# Patient Record
Sex: MALE | Race: WHITE | NOT HISPANIC OR LATINO | URBAN - METROPOLITAN AREA
[De-identification: names, ages, dates, MRNs, and addresses within clinical notes are randomized per-mention and may not be internally consistent; named-entity substitution may affect disease eponyms.]

---

## 2019-02-05 ENCOUNTER — EMERGENCY (EMERGENCY)
Facility: HOSPITAL | Age: 59
LOS: 0 days | Discharge: HOME | End: 2019-02-05
Attending: EMERGENCY MEDICINE | Admitting: EMERGENCY MEDICINE
Payer: COMMERCIAL

## 2019-02-05 VITALS
OXYGEN SATURATION: 97 % | RESPIRATION RATE: 18 BRPM | HEART RATE: 65 BPM | TEMPERATURE: 98 F | SYSTOLIC BLOOD PRESSURE: 176 MMHG | DIASTOLIC BLOOD PRESSURE: 97 MMHG | WEIGHT: 179.9 LBS

## 2019-02-05 DIAGNOSIS — S06.0X9A CONCUSSION WITH LOSS OF CONSCIOUSNESS OF UNSPECIFIED DURATION, INITIAL ENCOUNTER: ICD-10-CM

## 2019-02-05 DIAGNOSIS — Z85.528 PERSONAL HISTORY OF OTHER MALIGNANT NEOPLASM OF KIDNEY: ICD-10-CM

## 2019-02-05 DIAGNOSIS — M54.6 PAIN IN THORACIC SPINE: ICD-10-CM

## 2019-02-05 DIAGNOSIS — S00.81XA ABRASION OF OTHER PART OF HEAD, INITIAL ENCOUNTER: ICD-10-CM

## 2019-02-05 DIAGNOSIS — M25.511 PAIN IN RIGHT SHOULDER: ICD-10-CM
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
## 2019-02-05 DIAGNOSIS — Z79.899 OTHER LONG TERM (CURRENT) DRUG THERAPY: ICD-10-CM
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
## 2019-02-05 DIAGNOSIS — S09.90XA UNSPECIFIED INJURY OF HEAD, INITIAL ENCOUNTER: ICD-10-CM

## 2019-02-05 PROCEDURE — 70450 CT HEAD/BRAIN W/O DYE: CPT | Mod: 26

## 2019-02-05 PROCEDURE — 99284 EMERGENCY DEPT VISIT MOD MDM: CPT

## 2019-02-05 PROCEDURE — 73030 X-RAY EXAM OF SHOULDER: CPT | Mod: 26,RT

## 2019-02-05 PROCEDURE — 72125 CT NECK SPINE W/O DYE: CPT | Mod: 26

## 2019-02-05 PROCEDURE — 71250 CT THORAX DX C-: CPT | Mod: 26

## 2019-02-05 RX ORDER — TETANUS TOXOID, REDUCED DIPHTHERIA TOXOID AND ACELLULAR PERTUSSIS VACCINE, ADSORBED 5; 2.5; 8; 8; 2.5 [IU]/.5ML; [IU]/.5ML; UG/.5ML; UG/.5ML; UG/.5ML
0.5 SUSPENSION INTRAMUSCULAR ONCE
Qty: 0 | Refills: 0 | Status: DISCONTINUED | OUTPATIENT
Start: 2019-02-05 | End: 2019-02-05

## 2019-02-05 RX ORDER — ACETAMINOPHEN 500 MG
975 TABLET ORAL ONCE
Qty: 0 | Refills: 0 | Status: COMPLETED | OUTPATIENT
Start: 2019-02-05 | End: 2019-02-05

## 2019-02-05 RX ADMIN — Medication 975 MILLIGRAM(S): at 16:48

## 2019-02-05 NOTE — ED PROVIDER NOTE - ATTENDING CONTRIBUTION TO CARE
Dr. Webb: I have personally performed a face to face bedside history and physical examination of this patient. I have discussed the history, examination, review of systems, assessment and plan of management with the resident. I have reviewed the electronic medical record and amended it to reflect my history, review of systems, physical exam, assessment and plan.
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
## 2019-02-05 NOTE — ED PROVIDER NOTE - CARDIAC, MLM
Normal rate, regular rhythm.  Heart sounds S1, S2.  No murmurs, rubs or gallops. Normal rate, regular rhythm. normal radial pulse.
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
## 2019-02-05 NOTE — ED ADULT NURSE NOTE - NSIMPLEMENTINTERV_GEN_ALL_ED
Implemented All Universal Safety Interventions:  [place] to call system. Call bell, personal items and telephone within reach. Instruct patient to call for assistance. Room bathroom lighting operational. Non-slip footwear when patient is off stretcher. Physically safe environment: no spills, clutter or unnecessary equipment. Stretcher in lowest position, wheels locked, appropriate side rails in place.

## 2019-02-05 NOTE — ED PROVIDER NOTE - PROGRESS NOTE DETAILS
Ronal Gamboa for ED attending, Dr. Webb.  Pt seen and examined at bedside.  PE entered in chart. I Daniel Gamboa attest that this documentation has been prepared under the direction and in the presence of Doctor Wallo Anthony Bond (Resident): The patient’s cervical collar was clinically cleared using the NEXUS criteria: they have no focal neurologic deficit, no midline cervical spine tenderness is present, they have a normal level of consciousness and are not intoxicated, and no distracting injury is present. patient walked, feels better - d/w patient incidental findings of nodules and renal findings, but patient has known renal disease - will be safe to d/c home - d/w patient expected course of concussion
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
## 2019-02-05 NOTE — ED PROVIDER NOTE - ENMT, MLM
Airway patent, Nasal mucosa clear. Mouth with normal mucosa. Throat has no vesicles, no oropharyngeal exudates and uvula is midline. Airway patent, Nasal mucosa clear. Mouth with mildly dry mucosa. Throat has no vesicles, no oropharyngeal exudates and uvula is midline. Face: superficial abrasion overlying right cheek, no facial bone tenderness or deformity, b/l TMJs non-tender, ARFOM w/o pain.  Battles negative

## 2019-02-05 NOTE — ED PROVIDER NOTE - MUSCULOSKELETAL, MLM
Spine appears normal, range of motion is not limited, no muscle or joint tenderness Neck: +lower vertebral tenderness, C-collar in place.  Back: mid-upper vertebral midline tenderness w/o step off deformities.  right trapezius pain.  right shoulder pain +ARFOM w/ some pain.  MAEx4, no-focal swelling or tenderness, in other extremities.  Pelvis non-tender and stable.
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
## 2020-09-16 NOTE — ED ADULT TRIAGE NOTE - RESPIRATORY RATE (BREATHS/MIN)
18 Bilobed Transposition Flap Text: The defect edges were debeveled with a #15 scalpel blade.  Given the location of the defect and the proximity to free margins a bilobed transposition flap was deemed most appropriate.  Using a sterile surgical marker, an appropriate bilobe flap drawn around the defect.    The area thus outlined was incised deep to adipose tissue with a #15 scalpel blade.  The skin margins were undermined to an appropriate distance in all directions utilizing iris scissors.
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
